# Patient Record
(demographics unavailable — no encounter records)

---

## 2025-07-03 NOTE — END OF VISIT
[FreeTextEntry3] : I, Dr. Mosquera, personally performed the evaluation and management (E/M) services for this established patient who presents today with (a) new problem(s)/exacerbation of (an) existing condition(s). That E/M includes conducting the clinically appropriate interval history &/or exam, assessing all new/exacerbated conditions, and establishing a new plan of care. Today, my EM, Sentrinsicjorge Tristnains, was here to observe my evaluation and management service for this new problem/exacerbated condition and follow the plan of care established by me going forward

## 2025-07-03 NOTE — PHYSICAL EXAM
[Normal Appearance] : normal appearance [Well Groomed] : well groomed [General Appearance - In No Acute Distress] : no acute distress [Respiration, Rhythm And Depth] : normal respiratory rhythm and effort [Exaggerated Use Of Accessory Muscles For Inspiration] : no accessory muscle use [Normal Station and Gait] : the gait and station were normal for the patient's age [] : no rash [No Focal Deficits] : no focal deficits [Oriented To Time, Place, And Person] : oriented to person, place, and time [Affect] : the affect was normal [Mood] : the mood was normal [Urethral Meatus] : meatus normal [Penis Abnormality] : normal circumcised penis [Scrotum] : the scrotum was normal [Testes Tenderness] : no tenderness of the testes [Testes Mass (___cm)] : there were no testicular masses [de-identified] : B/L 20cc testes, no masses

## 2025-07-03 NOTE — HISTORY OF PRESENT ILLNESS
[FreeTextEntry1] : RANJANA ROSSI is a 45 year M presenting on 07/03/2025 PMH: hypogonadism, migraines, aortic dilation,   1 yr F/U  Now on Tcyp 250 mg/mL, taking 0.25mL twice weekly. Did bloodwork last week with Dr Hampton. No results. Will forward to us. 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 Planning to form embryos soon with egg donor.  ED: tadalafil 5mg or 10mg daily headache on sildenafil told of venous leak many years ago. Asking to restart trimix PRN and RX to try levitra  Had left cremasteric botox for retractile testis and pain. Now resolved  No urinary bother. No hematuria.  father adrenal carcinoma, and esophageal cancer

## 2025-07-03 NOTE — ASSESSMENT
[FreeTextEntry1] : 44yo M hypogonadism, ED previously told he has venous leak -UA, Ucx -continue Tcyp, dose pending bloodwork results -continue tadalafil 5mg daily -trial vardenafil -F/U PDUS

## 2025-07-10 NOTE — DISCUSSION/SUMMARY
[FreeTextEntry1] : CP/SOB/dilated root check echo if able to approve and schedule. HLD low HLD, will work on repeating lipids on follow up EKG section of the chart --- secondary to symptoms above an electrocardiogram also known as an EKG was performed.  Risks and benefits discussed with the patient. Patient was given time and privacy to changed into a gown. Shortly after, standard 10 leads were applied and a [email or web address] system was used to perform the study. The results were subsequently reviewed by attending physician and discussed with the patient. The study showed a normal sinus rhythm and no ST-T suggestive of ischemia. Order for the EKG was placed in the chart. The results were documented. Billing submitted.  [EKG obtained to assist in diagnosis and management of assessed problem(s)] : EKG obtained to assist in diagnosis and management of assessed problem(s)

## 2025-07-10 NOTE — REASON FOR VISIT
[Symptom and Test Evaluation] : symptom and test evaluation [FreeTextEntry1] : 45M comes in for a follow up visit. He was last seen in April of 2024. He feels well. Lipids were abnormal in the past, with low HLD. No chest pain noted. He is following up on the lung mass noted on prior studies. We are discussing a cardiac work up.

## 2025-07-24 NOTE — ASSESSMENT
[FreeTextEntry1] : ED psychogenic excellent hemodynamics Risks of intercavernous injection therapy were discussed at length. Specifically the risk of priapism was discussed. He understands that should he have an erection that lasts greater than 2 hours that he is to take 120 mg of Sudafed. Immediately afterward he is to call my office so that we can arrange for his further followup. He understands that if he has a four-hour erection that he should go straight away to the emergency department for further treatment. In addition, the as yet undefined risk of increased penile scar tissue, as well as Peyronie's disease, with the use of intercavernous injection therapies was discussed. He opts to proceed. will train with PAPAVERINE 5  f/u 3months after

## 2025-07-24 NOTE — HISTORY OF PRESENT ILLNESS
[FreeTextEntry1] : hre for doppler robust respone trimix 5 required 1000 mcg phenylephrine to detumesce